# Patient Record
Sex: MALE | Race: BLACK OR AFRICAN AMERICAN | NOT HISPANIC OR LATINO | URBAN - METROPOLITAN AREA
[De-identification: names, ages, dates, MRNs, and addresses within clinical notes are randomized per-mention and may not be internally consistent; named-entity substitution may affect disease eponyms.]

---

## 2017-06-10 ENCOUNTER — EMERGENCY (EMERGENCY)
Facility: HOSPITAL | Age: 32
LOS: 1 days | Discharge: HOME | End: 2017-06-10
Attending: EMERGENCY MEDICINE | Admitting: EMERGENCY MEDICINE
Payer: COMMERCIAL

## 2017-06-10 VITALS
OXYGEN SATURATION: 99 % | DIASTOLIC BLOOD PRESSURE: 87 MMHG | RESPIRATION RATE: 16 BRPM | TEMPERATURE: 99 F | SYSTOLIC BLOOD PRESSURE: 125 MMHG | HEART RATE: 77 BPM

## 2017-06-10 DIAGNOSIS — H57.8 OTHER SPECIFIED DISORDERS OF EYE AND ADNEXA: ICD-10-CM

## 2017-06-10 DIAGNOSIS — B30.1 CONJUNCTIVITIS DUE TO ADENOVIRUS: ICD-10-CM

## 2017-06-10 PROCEDURE — 99282 EMERGENCY DEPT VISIT SF MDM: CPT

## 2017-06-10 RX ADMIN — Medication 1 DROP(S): at 21:30

## 2017-06-10 NOTE — ED PROVIDER NOTE - OBJECTIVE STATEMENT
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 Initially began in right eye, now in both. Mild associated tearing.

## 2017-06-10 NOTE — ED PROVIDER NOTE - MEDICAL DECISION MAKING DETAILS
MD Marleen,Attending: pt seen . agree with above HPI.ROS.PE. likely adenovirus conjuc bilat. artifical tears/prn motrin and prn benadryl. Asked to be cautious with contact around his son. Return for any concerns

## 2019-01-28 ENCOUNTER — EMERGENCY (EMERGENCY)
Facility: HOSPITAL | Age: 34
LOS: 1 days | Discharge: HOME | End: 2019-01-28
Attending: EMERGENCY MEDICINE
Payer: COMMERCIAL

## 2019-01-28 VITALS
RESPIRATION RATE: 20 BRPM | SYSTOLIC BLOOD PRESSURE: 125 MMHG | TEMPERATURE: 98 F | HEART RATE: 71 BPM | DIASTOLIC BLOOD PRESSURE: 75 MMHG | WEIGHT: 179.9 LBS | HEIGHT: 70 IN | OXYGEN SATURATION: 99 %

## 2019-01-28 DIAGNOSIS — Z98.890 OTHER SPECIFIED POSTPROCEDURAL STATES: Chronic | ICD-10-CM

## 2019-01-28 PROCEDURE — 99284 EMERGENCY DEPT VISIT MOD MDM: CPT

## 2019-01-28 PROCEDURE — 99282 EMERGENCY DEPT VISIT SF MDM: CPT

## 2019-01-28 NOTE — ED PROVIDER NOTE - NSFOLLOWUPINSTRUCTIONS_ED_ALL_ED_FT
- Follow up with [facility] tomorrow  - Take your Post-exposure prophylaxis medication as indicated.  - Return to ED for new or worsening symptoms.

## 2019-01-28 NOTE — ED PROVIDER NOTE - OBJECTIVE STATEMENT
Attending MD Roy: 33M with no reported PMH, PSH R rotator cuff sx three years ago, meds denies, allergies denies presents to the ED with L index finger needlestick about 20 min ago.  Reports was doing a blood gas in critical care A, reports was a dirty needle, butterfly unknown gauge.  Reports needlestick to the lateral aspect of index finger between PIP and DIP.  Reports initially squeezed finger, washed hands with soap and water and then used alcohol pad to clean finger.  Denies sensory changes, motor limitations. HIV or Hep status of patient unknown.  Denies fevers, chills.  On exam, NAD, unlabored breathing, moving all extremities, head NCAT, moving all extremities with 5/5 strength bilateral upper and lower extremities, good and equal  strength bilaterally, small puncture at the lateral aspect of the index finger between the PIP and the DIP, cap refill <2s, sensory grossly intact; A/P: 33F with needlestick, will follow needlestick protocol

## 2019-01-28 NOTE — ED PROVIDER NOTE - PROGRESS NOTE DETAILS
Attending MD Roy: Yellow envelope given to source patient RN.  Cell [phone] (verbalized ok to leave message about HIV test results).  Source patient MRN [id number].  Discussed importance of [facility] follow up tomorrow.  Stable for discharge.  Follow up instructions given, importance of follow up emphasized, return to ED parameters reviewed and patient verbalized understanding.  All questions answered, all concerns addressed.

## 2019-01-28 NOTE — ED ADULT NURSE NOTE - OBJECTIVE STATEMENT
33 year old male with no pertinent PMH via walk in presenting with complaints of bodily fluid exposure. Pt reports doing blood gas for critical patient and was stuck with needle on accident. Pt unsure if source patient is positive for communicable diseases. Puncture to the lateral aspect fo the index finger. PT reports squeezing finger and washing with soap water, and then alcohol pad to the finger.

## 2019-01-28 NOTE — ED ADULT NURSE REASSESSMENT NOTE - NS ED NURSE REASSESS COMMENT FT1
Employee needle stick labs are not supposed to show in sunrise. Labs showed up in sunrise from lab (not from MD order). Lab called and states they made a mistake and will fix.

## 2020-04-26 ENCOUNTER — MESSAGE (OUTPATIENT)
Age: 35
End: 2020-04-26

## 2020-11-02 PROBLEM — Z00.00 ENCOUNTER FOR PREVENTIVE HEALTH EXAMINATION: Status: ACTIVE | Noted: 2020-11-02

## 2022-11-02 ENCOUNTER — APPOINTMENT (OUTPATIENT)
Dept: CARDIOLOGY | Facility: CLINIC | Age: 37
End: 2022-11-02

## 2022-11-09 ENCOUNTER — APPOINTMENT (OUTPATIENT)
Dept: CARDIOLOGY | Facility: CLINIC | Age: 37
End: 2022-11-09

## 2022-11-15 ENCOUNTER — APPOINTMENT (OUTPATIENT)
Dept: CARDIOLOGY | Facility: CLINIC | Age: 37
End: 2022-11-15